# Patient Record
Sex: FEMALE | Race: WHITE
[De-identification: names, ages, dates, MRNs, and addresses within clinical notes are randomized per-mention and may not be internally consistent; named-entity substitution may affect disease eponyms.]

---

## 2019-03-22 ENCOUNTER — HOSPITAL ENCOUNTER (OUTPATIENT)
Dept: HOSPITAL 84 - D.ER | Age: 39
Setting detail: OBSERVATION
LOS: 2 days | Discharge: HOME | End: 2019-03-24
Attending: FAMILY MEDICINE | Admitting: FAMILY MEDICINE
Payer: COMMERCIAL

## 2019-03-22 VITALS — SYSTOLIC BLOOD PRESSURE: 119 MMHG | DIASTOLIC BLOOD PRESSURE: 74 MMHG

## 2019-03-22 VITALS — DIASTOLIC BLOOD PRESSURE: 50 MMHG | SYSTOLIC BLOOD PRESSURE: 98 MMHG

## 2019-03-22 VITALS — WEIGHT: 130.27 LBS | HEIGHT: 62 IN | BODY MASS INDEX: 23.97 KG/M2

## 2019-03-22 VITALS — SYSTOLIC BLOOD PRESSURE: 121 MMHG | DIASTOLIC BLOOD PRESSURE: 70 MMHG

## 2019-03-22 VITALS — DIASTOLIC BLOOD PRESSURE: 64 MMHG | SYSTOLIC BLOOD PRESSURE: 123 MMHG

## 2019-03-22 VITALS — DIASTOLIC BLOOD PRESSURE: 55 MMHG | SYSTOLIC BLOOD PRESSURE: 99 MMHG

## 2019-03-22 DIAGNOSIS — G45.9: Primary | ICD-10-CM

## 2019-03-22 DIAGNOSIS — R13.0: ICD-10-CM

## 2019-03-22 DIAGNOSIS — I69.854: ICD-10-CM

## 2019-03-22 DIAGNOSIS — R41.0: ICD-10-CM

## 2019-03-22 DIAGNOSIS — R26.9: ICD-10-CM

## 2019-03-22 DIAGNOSIS — D64.9: ICD-10-CM

## 2019-03-22 DIAGNOSIS — Z72.0: ICD-10-CM

## 2019-03-22 DIAGNOSIS — F17.213: ICD-10-CM

## 2019-03-22 DIAGNOSIS — E03.9: ICD-10-CM

## 2019-03-22 LAB
ALBUMIN SERPL-MCNC: 3.4 G/DL (ref 3.4–5)
ALP SERPL-CCNC: 42 U/L (ref 46–116)
ALT SERPL-CCNC: 16 U/L (ref 10–68)
ANION GAP SERPL CALC-SCNC: 11.9 MMOL/L (ref 8–16)
APTT BLD: 26.2 SECONDS (ref 22.8–39.4)
BASOPHILS NFR BLD AUTO: 0.9 % (ref 0–2)
BILIRUB SERPL-MCNC: 0.22 MG/DL (ref 0.2–1.3)
BUN SERPL-MCNC: 7 MG/DL (ref 7–18)
CALCIUM SERPL-MCNC: 8.5 MG/DL (ref 8.5–10.1)
CHLORIDE SERPL-SCNC: 103 MMOL/L (ref 98–107)
CK MB SERPL-MCNC: 0.4 U/L (ref 0–3.6)
CK SERPL-CCNC: 65 UL (ref 21–215)
CO2 SERPL-SCNC: 27.9 MMOL/L (ref 21–32)
CREAT SERPL-MCNC: 0.8 MG/DL (ref 0.6–1.3)
EOSINOPHIL NFR BLD: 2.1 % (ref 0–7)
ERYTHROCYTE [DISTWIDTH] IN BLOOD BY AUTOMATED COUNT: 14.7 % (ref 11.5–14.5)
GLOBULIN SER-MCNC: 3.3 G/L
GLUCOSE SERPL-MCNC: 84 MG/DL (ref 74–106)
HCT VFR BLD CALC: 26.7 % (ref 36–48)
HGB BLD-MCNC: 8.2 G/DL (ref 12–16)
IMM GRANULOCYTES NFR BLD: 0.2 % (ref 0–5)
INR PPP: 1.08 (ref 0.85–1.17)
IRON SERPL-MCNC: 9 UG/DL (ref 35–150)
LYMPHOCYTES NFR BLD AUTO: 35.2 % (ref 15–50)
MAGNESIUM SERPL-MCNC: 2 MG/DL (ref 1.8–2.4)
MCH RBC QN AUTO: 25.1 PG (ref 26–34)
MCHC RBC AUTO-ENTMCNC: 30.7 G/DL (ref 31–37)
MCV RBC: 81.7 FL (ref 80–100)
MONOCYTES NFR BLD: 7.2 % (ref 2–11)
NEUTROPHILS NFR BLD AUTO: 54.4 % (ref 40–80)
OSMOLALITY SERPL CALC.SUM OF ELEC: 274 MOSM/KG (ref 275–300)
PLATELET # BLD: 427 10X3/UL (ref 130–400)
PMV BLD AUTO: 8.4 FL (ref 7.4–10.4)
POTASSIUM SERPL-SCNC: 3.8 MMOL/L (ref 3.5–5.1)
PROT SERPL-MCNC: 6.7 G/DL (ref 6.4–8.2)
PROTHROMBIN TIME: 13.5 SECONDS (ref 11.6–15)
RBC # BLD AUTO: 3.27 10X6/UL (ref 4–5.4)
SAO2 % BLD FROM PO2: 2 % (ref 15–55)
SODIUM SERPL-SCNC: 139 MMOL/L (ref 136–145)
T4 FREE SERPL-MCNC: 0.73 NG/DL (ref 0.76–1.46)
T4 SERPL-MCNC: 5.2 UG/DL (ref 4.7–13.3)
TIBC SERPL-MCNC: 305 UG/DL (ref 260–445)
TROPONIN I SERPL-MCNC: < 0.017 NG/ML (ref 0–0.06)
TSH SERPL-ACNC: 6.21 UIU/ML (ref 0.36–3.74)
UIBC SERPL-MCNC: 296 UG/DL (ref 150–375)
WBC # BLD AUTO: 5.7 10X3/UL (ref 4.8–10.8)

## 2019-03-22 NOTE — NUR
UNIT ADMINISTRATER HAD TO BABYSIT THE PTS CHILD SO SHE COULD GET HER MRI. SHE
ADVISED THE PT TO HACE THE CHILD TAKEN HOME SOON, PT SAID IT WOULD BE A WHILE.
HOUSE SUPERVISOR WAS INFORMED.

## 2019-03-23 VITALS — SYSTOLIC BLOOD PRESSURE: 104 MMHG | DIASTOLIC BLOOD PRESSURE: 59 MMHG

## 2019-03-23 VITALS — DIASTOLIC BLOOD PRESSURE: 56 MMHG | SYSTOLIC BLOOD PRESSURE: 110 MMHG

## 2019-03-23 VITALS — DIASTOLIC BLOOD PRESSURE: 56 MMHG | SYSTOLIC BLOOD PRESSURE: 107 MMHG

## 2019-03-23 VITALS — SYSTOLIC BLOOD PRESSURE: 90 MMHG | DIASTOLIC BLOOD PRESSURE: 54 MMHG

## 2019-03-23 VITALS — SYSTOLIC BLOOD PRESSURE: 101 MMHG | DIASTOLIC BLOOD PRESSURE: 56 MMHG

## 2019-03-23 LAB
ALBUMIN SERPL-MCNC: 2.9 G/DL (ref 3.4–5)
ALP SERPL-CCNC: 29 U/L (ref 46–116)
ALT SERPL-CCNC: 13 U/L (ref 10–68)
ANION GAP SERPL CALC-SCNC: 10.6 MMOL/L (ref 8–16)
BASOPHILS NFR BLD AUTO: 0.4 % (ref 0–2)
BILIRUB SERPL-MCNC: 0.1 MG/DL (ref 0.2–1.3)
BUN SERPL-MCNC: 9 MG/DL (ref 7–18)
CALCIUM SERPL-MCNC: 7.8 MG/DL (ref 8.5–10.1)
CHLORIDE SERPL-SCNC: 107 MMOL/L (ref 98–107)
CO2 SERPL-SCNC: 28.4 MMOL/L (ref 21–32)
CREAT SERPL-MCNC: 0.7 MG/DL (ref 0.6–1.3)
EOSINOPHIL NFR BLD: 4.5 % (ref 0–7)
ERYTHROCYTE [DISTWIDTH] IN BLOOD BY AUTOMATED COUNT: 14.9 % (ref 11.5–14.5)
FOLATE SERPL-MCNC: 11.8 NG/ML (ref 3–?)
GLOBULIN SER-MCNC: 2.9 G/L
GLUCOSE SERPL-MCNC: 90 MG/DL (ref 74–106)
HCT VFR BLD CALC: 27.4 % (ref 36–48)
HGB BLD-MCNC: 8.3 G/DL (ref 12–16)
IMM GRANULOCYTES NFR BLD: 0 % (ref 0–5)
LYMPHOCYTES NFR BLD AUTO: 43.6 % (ref 15–50)
MCH RBC QN AUTO: 25 PG (ref 26–34)
MCHC RBC AUTO-ENTMCNC: 30.3 G/DL (ref 31–37)
MCV RBC: 82.5 FL (ref 80–100)
MONOCYTES NFR BLD: 8.7 % (ref 2–11)
NEUTROPHILS NFR BLD AUTO: 42.8 % (ref 40–80)
OSMOLALITY SERPL CALC.SUM OF ELEC: 281 MOSM/KG (ref 275–300)
PLATELET # BLD: 456 10X3/UL (ref 130–400)
PMV BLD AUTO: 8.6 FL (ref 7.4–10.4)
POTASSIUM SERPL-SCNC: 4 MMOL/L (ref 3.5–5.1)
PROT SERPL-MCNC: 5.8 G/DL (ref 6.4–8.2)
RBC # BLD AUTO: 3.32 10X6/UL (ref 4–5.4)
SODIUM SERPL-SCNC: 142 MMOL/L (ref 136–145)
VIT B12 SERPL-MCNC: 487 PG/ML (ref 232–1245)
WBC # BLD AUTO: 4.7 10X3/UL (ref 4.8–10.8)

## 2019-03-23 NOTE — NUR
SPOKE WITH PATIENT ABOUT CHILD STAYING IN ROOM. INFORMED PT AGAIN THAT CHILD
CANNOT STAY. PT HAS NO ONE TO COME AND GET THE CHILD. SPOKE WITH HOUSE SUP WHO
INSTRUCTS THAT IF NO ONE COMES TO GET CHILD- [facility] WILL HAVE TO BE CALLED.
PATIENT INFORMED OF THIS. PATIENT ASKED IF SHE COULD LEAVE THE HOSPITAL AND I
INFORMED HER YES BUT IT WILL BE AMA.

## 2019-03-23 NOTE — NUR
INITIAL ROUNDS COMPLETED AT 1920 HRS. PT DENIED ANY DISCOMFORT.  ASSESSMENT
COMPLETED AT 2030 HRS.  VSS.  SR PER CM HR 63. PT ALERT AND ORIENTED TO
PERSON, PLACE AND TIME.  SARAH.  EQUAL AND STRONG HAND AND FOOT STRENGHT.
LUNGS CTA. IV TO RAC TENDER.  DC'D WITH CATHETER INTACT.  PT DECLINED NEW IV.
PT CURRENTLY WATCHING TV; CALL LIGHT WITHIN REACH.

## 2019-03-24 VITALS — DIASTOLIC BLOOD PRESSURE: 61 MMHG | SYSTOLIC BLOOD PRESSURE: 100 MMHG

## 2019-03-24 VITALS — DIASTOLIC BLOOD PRESSURE: 67 MMHG | SYSTOLIC BLOOD PRESSURE: 106 MMHG

## 2019-03-24 LAB
ALBUMIN SERPL-MCNC: 2.9 G/DL (ref 3.4–5)
ALP SERPL-CCNC: 39 U/L (ref 46–116)
ALT SERPL-CCNC: 15 U/L (ref 10–68)
ANION GAP SERPL CALC-SCNC: 9.5 MMOL/L (ref 8–16)
BASOPHILS NFR BLD AUTO: 0.6 % (ref 0–2)
BILIRUB SERPL-MCNC: 0.08 MG/DL (ref 0.2–1.3)
BUN SERPL-MCNC: 11 MG/DL (ref 7–18)
CALCIUM SERPL-MCNC: 8 MG/DL (ref 8.5–10.1)
CHLORIDE SERPL-SCNC: 106 MMOL/L (ref 98–107)
CO2 SERPL-SCNC: 29.9 MMOL/L (ref 21–32)
CREAT SERPL-MCNC: 0.7 MG/DL (ref 0.6–1.3)
EOSINOPHIL NFR BLD: 4.7 % (ref 0–7)
ERYTHROCYTE [DISTWIDTH] IN BLOOD BY AUTOMATED COUNT: 14.9 % (ref 11.5–14.5)
GLOBULIN SER-MCNC: 3.2 G/L
GLUCOSE SERPL-MCNC: 95 MG/DL (ref 74–106)
HCT VFR BLD CALC: 29.3 % (ref 36–48)
HGB BLD-MCNC: 8.7 G/DL (ref 12–16)
IMM GRANULOCYTES NFR BLD: 0.2 % (ref 0–5)
LYMPHOCYTES NFR BLD AUTO: 34.4 % (ref 15–50)
MCH RBC QN AUTO: 24.6 PG (ref 26–34)
MCHC RBC AUTO-ENTMCNC: 29.7 G/DL (ref 31–37)
MCV RBC: 83 FL (ref 80–100)
MONOCYTES NFR BLD: 8.9 % (ref 2–11)
NEUTROPHILS NFR BLD AUTO: 51.2 % (ref 40–80)
OSMOLALITY SERPL CALC.SUM OF ELEC: 279 MOSM/KG (ref 275–300)
PLATELET # BLD: 467 10X3/UL (ref 130–400)
PMV BLD AUTO: 8.7 FL (ref 7.4–10.4)
POTASSIUM SERPL-SCNC: 4.4 MMOL/L (ref 3.5–5.1)
PROT SERPL-MCNC: 6.1 G/DL (ref 6.4–8.2)
RBC # BLD AUTO: 3.53 10X6/UL (ref 4–5.4)
SODIUM SERPL-SCNC: 141 MMOL/L (ref 136–145)
WBC # BLD AUTO: 5.1 10X3/UL (ref 4.8–10.8)

## 2019-03-24 NOTE — NUR
VSS THROUGHOUT NIGHT.  PT RESTED WELL DURING SHIFT.  NO CHANGES TO NEURO
STATUS NOTED.  NEEDS MET; WILL CONTINUE TO MONITOR.

## 2019-03-25 NOTE — MORECARE
CASE MANAGEMENT DISCHARGE SUMMARY
 
 
PATIENT: MERCEDES MONTES                          UNIT: [id number]
ACCOUNT#: [id number]                       ADM DATE: 19
AGE: 38     : [birth year]  SEX: F            ROOM/BED: D.2133    
AUTHOR: KIMBERLY ZHAO                             PHYSICIAN:                               
 
REFERRING PHYSICIAN: ELSA ARREAGA MD               
DATE OF SERVICE: 19
Discharge Plan
 
 
Patient Name: MERCEDES MONTES
Facility: [facility]:[place]
Encounter #: [id number]
Medical Record #: [id number]
: 1980
Planned Disposition: Home
Anticipated Discharge Date: 3/24/19
 
Discharge Date: 2019
Expected LOS: 2
Initial Reviewer: [email or web address]
Initial Review Date: 2019
Generated: 3/25/19   9:56 am 
Comments
 
DCP- Discharge Planning
 
Updated by [email or web address]: Elham Knox on 3/23/19   9:12 pm CT
LATE ENTRY  
PATIENT'S MOTHER WILL HAVE TO DRIVE FROM [place] TO  PATIENT FOR 
DISCHARGE AS PER THE PATIENT.   
PATIENT DID NOT FEEL SAFE DRIVING THAT DISTANCE TO HOME (8 HRS),  
HER MOTHER WOULD ARRIVE AT [phone] IN THE AM,   
PATIENT WILL BE DISCHARGED 3/24/19 FOR SAFE DISCHARGE AND TRANSPORTATION TO 
HOME
  
 
 
 
 
 
 
 
Patient Name: MERCEDES MONTES
 
Encounter #: [id number]
Page [id number]
 
 
 
 
 
Electronically Signed by KIMBERLY ZHAO on 19 at 0856
 
 
 
 
 
 
**All edits/amendments must be made on the electronic document**
 
DICTATION DATE: 19     : CALI  1953     
RPT#: [id number]                                DC DATE:19
                                               STATUS: DIS IN  
[facility]
[address] [place] [address]
***END OF REPORT***